# Patient Record
Sex: MALE | URBAN - METROPOLITAN AREA
[De-identification: names, ages, dates, MRNs, and addresses within clinical notes are randomized per-mention and may not be internally consistent; named-entity substitution may affect disease eponyms.]

---

## 2024-01-29 ENCOUNTER — OFFICE VISIT (OUTPATIENT)
Dept: URBAN - METROPOLITAN AREA CLINIC 33 | Facility: CLINIC | Age: 89
End: 2024-01-29
Payer: MEDICARE

## 2024-01-29 ENCOUNTER — LAB OUTSIDE AN ENCOUNTER (OUTPATIENT)
Dept: URBAN - METROPOLITAN AREA CLINIC 33 | Facility: CLINIC | Age: 89
End: 2024-01-29

## 2024-01-29 VITALS
WEIGHT: 214 LBS | DIASTOLIC BLOOD PRESSURE: 68 MMHG | HEIGHT: 73 IN | HEART RATE: 84 BPM | SYSTOLIC BLOOD PRESSURE: 106 MMHG | BODY MASS INDEX: 28.36 KG/M2 | OXYGEN SATURATION: 94 %

## 2024-01-29 DIAGNOSIS — R13.19 OTHER DYSPHAGIA: ICD-10-CM

## 2024-01-29 DIAGNOSIS — K59.09 OTHER CONSTIPATION: ICD-10-CM

## 2024-01-29 PROBLEM — 14760008: Status: ACTIVE | Noted: 2024-01-29

## 2024-01-29 PROCEDURE — 99204 OFFICE O/P NEW MOD 45 MIN: CPT | Performed by: INTERNAL MEDICINE

## 2024-01-29 RX ORDER — APIXABAN 5 MG/1
1 TABLET TABLET, FILM COATED ORAL TWICE A DAY
Status: ACTIVE | COMMUNITY

## 2024-01-29 RX ORDER — CABERGOLINE 0.5 MG/1
1 TABLET TABLET ORAL
Status: ACTIVE | COMMUNITY

## 2024-01-29 RX ORDER — LEVOTHYROXINE SODIUM 100 UG/1
1 TABLET IN THE MORNING ON AN EMPTY STOMACH TABLET ORAL ONCE A DAY
Status: ACTIVE | COMMUNITY

## 2024-01-29 RX ORDER — TAMSULOSIN HYDROCHLORIDE 0.4 MG/1
1 CAPSULE CAPSULE ORAL ONCE A DAY
Status: ACTIVE | COMMUNITY

## 2024-01-29 RX ORDER — OMEPRAZOLE 10 MG/1
1 CAPSULE 30 MINUTES BEFORE MORNING MEAL CAPSULE, DELAYED RELEASE ORAL ONCE A DAY
Status: ACTIVE | COMMUNITY

## 2024-01-29 RX ORDER — POLYETHYLENE GLYCOL 3350, SODIUM SULFATE ANHYDROUS, SODIUM BICARBONATE, SODIUM CHLORIDE, POTASSIUM CHLORIDE 236; 22.74; 6.74; 5.86; 2.97 G/4L; G/4L; G/4L; G/4L; G/4L
ML AS DIRECTED POWDER, FOR SOLUTION ORAL
Qty: 1 | Refills: 0 | OUTPATIENT
Start: 2024-01-29 | End: 2024-01-31

## 2024-01-29 RX ORDER — LORAZEPAM 1 MG/1
1 TABLET AT BEDTIME AS NEEDED TABLET ORAL ONCE A DAY
Status: ACTIVE | COMMUNITY

## 2024-01-29 RX ORDER — FINASTERIDE 5 MG/1
1 TABLET TABLET, FILM COATED ORAL ONCE A DAY
Status: ACTIVE | COMMUNITY

## 2024-01-29 RX ORDER — MIDODRINE HYDROCHLORIDE 5 MG/1
1 TABLET TABLET ORAL TWICE A DAY
Status: ACTIVE | COMMUNITY

## 2024-01-29 RX ORDER — ESCITALOPRAM OXALATE 10 MG/1
1 TABLET TABLET ORAL ONCE A DAY
Status: ACTIVE | COMMUNITY

## 2024-01-29 RX ORDER — ROSUVASTATIN CALCIUM 20 MG/1
1 TABLET TABLET, COATED ORAL ONCE A DAY
Status: ACTIVE | COMMUNITY

## 2024-01-29 RX ORDER — MIRTAZAPINE 15 MG/1
1 TABLET AT BEDTIME TABLET, FILM COATED ORAL ONCE A DAY
Status: ACTIVE | COMMUNITY

## 2024-01-29 NOTE — HPI-DYSPHAGIA
Patient presents today for consultation about dysphagia.  Onset was approximately 9/10 months ago and episodes almost daily .  Dysphagia occurs with meds and certain foods . He feels as though meds get stuck .  Patient denies having previous EGD or Barium Swallow completed.

## 2024-01-29 NOTE — HPI-CONSTIPATION
89 year old male patient presents for constipation consult. Patient states symptoms began approximately 2-3 years ago .  Patient admits taking OTC medications Miralax , colace , and prune juice for relief. Patient admits having 1 bowel movement every 4-5 days . He mentions associated abdominal discomfort and also stated he noticed a lump in RUQ of abdomen , possibly from straining .   Patient admits stools are hard. Patient denies rectal bleeding /melena.   Patient admits  strenuous bowel movements.  Patient denies having recent abdominal Xray. Patient admits  having recent labs.  Abdominal U/S (12/28/2023) Limited study due to bowel gas as well as the patient being unable to hold  his breath . post cholecystectomy , fatty liver suspected which is borderline enlarged , findings suggested chronic medical renal disease involving the right kidney .

## 2024-02-12 ENCOUNTER — OV EP (OUTPATIENT)
Dept: URBAN - METROPOLITAN AREA CLINIC 33 | Facility: CLINIC | Age: 89
End: 2024-02-12
Payer: MEDICARE

## 2024-02-12 VITALS
SYSTOLIC BLOOD PRESSURE: 120 MMHG | DIASTOLIC BLOOD PRESSURE: 60 MMHG | BODY MASS INDEX: 28.6 KG/M2 | WEIGHT: 215.8 LBS | HEIGHT: 73 IN | HEART RATE: 86 BPM | OXYGEN SATURATION: 92 %

## 2024-02-12 DIAGNOSIS — K59.01 CONSTIPATION: ICD-10-CM

## 2024-02-12 DIAGNOSIS — K22.89 PRESBYESOPHAGUS: ICD-10-CM

## 2024-02-12 DIAGNOSIS — R13.19 OTHER DYSPHAGIA: ICD-10-CM

## 2024-02-12 DIAGNOSIS — R05.9 COUGH: ICD-10-CM

## 2024-02-12 PROBLEM — 40739000: Status: ACTIVE | Noted: 2024-02-12

## 2024-02-12 PROBLEM — 235634004: Status: ACTIVE | Noted: 2024-02-12

## 2024-02-12 PROBLEM — 49727002: Status: ACTIVE | Noted: 2024-02-12

## 2024-02-12 PROCEDURE — 99213 OFFICE O/P EST LOW 20 MIN: CPT | Performed by: INTERNAL MEDICINE

## 2024-02-12 RX ORDER — OMEPRAZOLE 10 MG/1
1 CAPSULE 30 MINUTES BEFORE MORNING MEAL CAPSULE, DELAYED RELEASE ORAL ONCE A DAY
Status: ACTIVE | COMMUNITY

## 2024-02-12 RX ORDER — APIXABAN 5 MG/1
1 TABLET TABLET, FILM COATED ORAL TWICE A DAY
Status: ACTIVE | COMMUNITY

## 2024-02-12 RX ORDER — TAMSULOSIN HYDROCHLORIDE 0.4 MG/1
1 CAPSULE CAPSULE ORAL ONCE A DAY
Status: ACTIVE | COMMUNITY

## 2024-02-12 RX ORDER — CABERGOLINE 0.5 MG/1
1 TABLET TABLET ORAL
Status: ACTIVE | COMMUNITY

## 2024-02-12 RX ORDER — ESCITALOPRAM OXALATE 10 MG/1
1 TABLET TABLET ORAL ONCE A DAY
Status: ACTIVE | COMMUNITY

## 2024-02-12 RX ORDER — ROSUVASTATIN CALCIUM 20 MG/1
1 TABLET TABLET, COATED ORAL ONCE A DAY
Status: ACTIVE | COMMUNITY

## 2024-02-12 RX ORDER — FINASTERIDE 5 MG/1
1 TABLET TABLET, FILM COATED ORAL ONCE A DAY
Status: ACTIVE | COMMUNITY

## 2024-02-12 RX ORDER — LORAZEPAM 1 MG/1
1 TABLET AT BEDTIME AS NEEDED TABLET ORAL ONCE A DAY
Status: ACTIVE | COMMUNITY

## 2024-02-12 RX ORDER — MIDODRINE HYDROCHLORIDE 5 MG/1
1 TABLET TABLET ORAL TWICE A DAY
Status: ACTIVE | COMMUNITY

## 2024-02-12 RX ORDER — LEVOTHYROXINE SODIUM 100 UG/1
1 TABLET IN THE MORNING ON AN EMPTY STOMACH TABLET ORAL ONCE A DAY
Status: ACTIVE | COMMUNITY

## 2024-02-12 RX ORDER — MIRTAZAPINE 15 MG/1
1 TABLET AT BEDTIME TABLET, FILM COATED ORAL ONCE A DAY
Status: ACTIVE | COMMUNITY

## 2024-02-12 NOTE — HPI-CONSTIPATION
Patient presents for follow up of constipation. Patient admits taking Golytely Solution Reconstituted, 236 GM, mL with improve symptoms. He states after ompleting the barium swallow, he was not able to have a bowel movement for 6 days. After those 6 days, patient state he has 3 bowel movements within two days with some strains. His stools are hard without the presence of blood, mucus, and melena. He denies any rectal pain/itching or pr ani. Patient admits continued use of OTC Miralax,  prune juice, citrucel, and water with improve symptoms.   Of last visit( 01/29/2024) 89 year old male patient presents for constipation consult. Patient states symptoms began approximately 2-3 years ago .  Patient admits taking OTC medications Miralax , colace , and prune juice for relief. Patient admits having 1 bowel movement every 4-5 days . He mentions associated abdominal discomfort and also stated he noticed a lump in RUQ of abdomen , possibly from straining .   Patient admits stools are hard. Patient denies rectal bleeding /melena.   Patient admits  strenuous bowel movements.  Patient denies having recent abdominal Xray. Patient admits  having recent labs.  Abdominal U/S (12/28/2023) Limited study due to bowel gas as well as the patient being unable to hold  his breath . post cholecystectomy , fatty liver suspected which is borderline enlarged , findings suggested chronic medical renal disease involving the right kidney .

## 2024-02-12 NOTE — HPI-DYSPHAGIA
Patient presents for follow up of dysphagia. Patient completed  barium swallow 02/05/2024 that revealed presbyesophagus , no esophageal stricture or esophagitis and no reflux or hiatal hernia . He denies any improvement. Patient states his symptoms has worsen. He states he experiencing a lot of flim/mucus throughout the day. Patient states he drinks a little vingear and honey with some relief of his symptoms. Patient states he is araid to take any of his meds due to meds may not go down properly. He states after having covid, he noticed that his appetite has decrease. Patient states on yesterday he consumed two chicken wings, one deviled eggs and was completely full. He states there are days when he does not feel like he is hungry. Patient states he was prescribe a medication by his PCP to help increase his appetite, but states his appetite has not increase at all. He states he use to consume bigger meals a long time ago, but now he is unabe to.   Of last visit (01/29/2024) Patient presents today for consultation about dysphagia.  Onset was approximately 9/10 months ago and episodes almost daily .  Dysphagia occurs with meds and certain foods . He feels as though meds get stuck .  Patient denies having previous EGD or Barium Swallow completed.